# Patient Record
(demographics unavailable — no encounter records)

---

## 2025-06-02 NOTE — HISTORY OF PRESENT ILLNESS
[FreeTextEntry8] : pt c/o on/off dizziness x2wks  pt reports episode of room spinning sensation x 2 that occurred 2 weeks ago.  Has NOT recurred since.  States when transferring from lying position to standing position felt room spinning sensation lasting 1-2 seconds .    Denies CP/SOB no palpitations  no N/V/D  +ve BM daily no bloody/black stools  no urinary complaints

## 2025-06-02 NOTE — PLAN
[FreeTextEntry1] : Reassurance provided   INCREASE hydration!!!!   pt may be liberal c dietary intake of Na+     see lab orders   f/u pending lab results   cont'd Oncology f/u c Trino Malave MD  [facility]

## 2025-06-02 NOTE — PHYSICAL EXAM
[No Acute Distress] : no acute distress [PERRL] : pupils equal round and reactive to light [EOMI] : extraocular movements intact [Normal Outer Ear/Nose] : the outer ears and nose were normal in appearance [No Respiratory Distress] : no respiratory distress  [No Accessory Muscle Use] : no accessory muscle use [Clear to Auscultation] : lungs were clear to auscultation bilaterally [Normal Rate] : normal rate  [Regular Rhythm] : with a regular rhythm [Normal S1, S2] : normal S1 and S2 [Non-distended] : non-distended [No CVA Tenderness] : no CVA  tenderness [Grossly Normal Strength/Tone] : grossly normal strength/tone [No Rash] : no rash [Speech Grossly Normal] : speech grossly normal [Normal Affect] : the affect was normal [Normal Mood] : the mood was normal [Normal Insight/Judgement] : insight and judgment were intact [No JVD] : no jugular venous distention [Soft] : abdomen soft [Non Tender] : non-tender [No Masses] : no abdominal mass palpated [No HSM] : no HSM

## 2025-07-25 NOTE — PHYSICAL EXAM
[No CVA Tenderness] : no CVA  tenderness [No Spinal Tenderness] : no spinal tenderness [Normal] : affect was normal and insight and judgment were intact

## 2025-07-25 NOTE — HISTORY OF PRESENT ILLNESS
[FreeTextEntry8] : 52 yo male presents with complaint of low back pain on the right side, he says it began 6 months ago, when he walks he feels pain in the back, sharp, worse with use. Denies fever, chills, cp, palpitations, sob, nvcd.

## 2025-07-25 NOTE — ASSESSMENT
[FreeTextEntry1] : Chronic low back pain: f/u xray lumbar ordered, start naproxen 375 mg po bid prn for pain, recommend low int stretching Laryngopharyngeal reflux: start omeprazole 40 mg po daily prn, avoid late night meals/snacks (2 - 3 hours before bedtime), avoid smoking, avoid tight clothing especially around stomach area, avoid foods that worsen symptoms which can include coffee, chocolate, alcohol, peppermint, and fatty foods. RTC 2 wks